# Patient Record
Sex: FEMALE | Race: NATIVE HAWAIIAN OR OTHER PACIFIC ISLANDER | NOT HISPANIC OR LATINO | Employment: FULL TIME | URBAN - METROPOLITAN AREA
[De-identification: names, ages, dates, MRNs, and addresses within clinical notes are randomized per-mention and may not be internally consistent; named-entity substitution may affect disease eponyms.]

---

## 2022-07-27 ENCOUNTER — GYNECOLOGY VISIT (OUTPATIENT)
Dept: OBGYN | Facility: CLINIC | Age: 22
End: 2022-07-27

## 2022-07-27 VITALS — SYSTOLIC BLOOD PRESSURE: 98 MMHG | WEIGHT: 140 LBS | DIASTOLIC BLOOD PRESSURE: 64 MMHG

## 2022-07-27 DIAGNOSIS — Z34.82 ENCOUNTER FOR SUPERVISION OF OTHER NORMAL PREGNANCY IN SECOND TRIMESTER: ICD-10-CM

## 2022-07-27 PROBLEM — Z34.92 SUPERVISION OF NORMAL PREGNANCY IN SECOND TRIMESTER: Status: ACTIVE | Noted: 2022-07-27

## 2022-07-27 PROCEDURE — 76830 TRANSVAGINAL US NON-OB: CPT | Performed by: OBSTETRICS & GYNECOLOGY

## 2022-07-27 PROCEDURE — 99203 OFFICE O/P NEW LOW 30 MIN: CPT | Mod: 25 | Performed by: OBSTETRICS & GYNECOLOGY

## 2022-07-27 NOTE — PROGRESS NOTES
21 y.o.  Unknown By LMP Patient's last menstrual period was 2022 (approximate).  here for confirmation of pregnancy. Pt has not gotten any PNC to date. She denies any Vb/cramping, reports minimal N/V. This is a planned pregnancy. Pt is happy to continue this pregnancy. Pt is not taking a PNV +DHA. She is overall well, without complaints. Last pap smear was never due to age.    COVID vaccine: Yes + booster  Flu vaccine: No    I have reviewed the patients'  medical, obstetrical,social, and family histories, medications and available lab results.    No pain, bleeding, unusual discharge or leeking              Allergies: Patient has no known allergies.    History reviewed. No pertinent past medical history.  Past Surgical History:   Procedure Laterality Date   • ABDOMINAL EXPLORATION         Objective:BP (!) 98/64   Wt 63.5 kg (140 lb)      HEENT:atraumatic  normocephalic  Abdomen:Soft, nontender, no hernias, masses, or organomegaly  Extremities:Normal    Abdominal Ultrasound performed and read by myself:  Nml appearing uterus and unable to visualize ovaries    Second/third trimester findings: BPD: 3.50 cm HC: 13.37 cm AC: 11.10 cm, FL 1.96 cm, Grossly nml AFT. Placenta appears posterior/fundal. AUA 16w4d    Assessment: 20 yo  @ 17w0d EDC 2023 doing well       Plan:  -Pt encouraged to start PNV+DHA  -SAB precautions reviewed  -1st tri prenatal labs to be ordered at initial OB labs  -Discussed dos and don'ts of pregnancy  -S/p COVID vaccine  -RTC in 1-2 weeks for initial OB appt.

## 2022-07-27 NOTE — PROGRESS NOTES
Patient here for GYN/DUB.  LMP= 3/30/22  ALEXANDRE= 1/4/23  GA= 17w0d  Last pap = pt states she has never had one   Phone number: [phone]   Pharmacy verified  c/o

## 2023-01-06 ENCOUNTER — HOSPITAL ENCOUNTER (INPATIENT)
Facility: MEDICAL CENTER | Age: 23
LOS: 2 days | End: 2023-01-08
Attending: OBSTETRICS & GYNECOLOGY | Admitting: OBSTETRICS & GYNECOLOGY
Payer: COMMERCIAL

## 2023-01-06 ENCOUNTER — ANESTHESIA (OUTPATIENT)
Dept: ANESTHESIOLOGY | Facility: MEDICAL CENTER | Age: 23
End: 2023-01-06
Payer: COMMERCIAL

## 2023-01-06 ENCOUNTER — ANESTHESIA EVENT (OUTPATIENT)
Dept: ANESTHESIOLOGY | Facility: MEDICAL CENTER | Age: 23
End: 2023-01-06
Payer: COMMERCIAL

## 2023-01-06 PROBLEM — O47.9 UTERINE CONTRACTIONS: Status: ACTIVE | Noted: 2023-01-06

## 2023-01-06 LAB
ABO GROUP BLD: NORMAL
BASOPHILS # BLD AUTO: 0.4 % (ref 0–1.8)
BASOPHILS # BLD: 0.04 K/UL (ref 0–0.12)
BLD GP AB SCN SERPL QL: NORMAL
EOSINOPHIL # BLD AUTO: 0.17 K/UL (ref 0–0.51)
EOSINOPHIL NFR BLD: 1.6 % (ref 0–6.9)
ERYTHROCYTE [DISTWIDTH] IN BLOOD BY AUTOMATED COUNT: 42.5 FL (ref 35.9–50)
GP B STREP DNA SPEC QL NAA+PROBE: NEGATIVE
HBV SURFACE AG SER QL: ABNORMAL
HCT VFR BLD AUTO: 34.7 % (ref 37–47)
HCV AB SER QL: ABNORMAL
HGB BLD-MCNC: 11.1 G/DL (ref 12–16)
HIV 1+2 AB+HIV1 P24 AG SERPL QL IA: NORMAL
IMM GRANULOCYTES # BLD AUTO: 0.08 K/UL (ref 0–0.11)
IMM GRANULOCYTES NFR BLD AUTO: 0.8 % (ref 0–0.9)
LYMPHOCYTES # BLD AUTO: 1.56 K/UL (ref 1–4.8)
LYMPHOCYTES NFR BLD: 14.7 % (ref 22–41)
MCH RBC QN AUTO: 27.4 PG (ref 27–33)
MCHC RBC AUTO-ENTMCNC: 32 G/DL (ref 33.6–35)
MCV RBC AUTO: 85.7 FL (ref 81.4–97.8)
MONOCYTES # BLD AUTO: 0.66 K/UL (ref 0–0.85)
MONOCYTES NFR BLD AUTO: 6.2 % (ref 0–13.4)
NEUTROPHILS # BLD AUTO: 8.13 K/UL (ref 2–7.15)
NEUTROPHILS NFR BLD: 76.3 % (ref 44–72)
NRBC # BLD AUTO: 0 K/UL
NRBC BLD-RTO: 0 /100 WBC
PLATELET # BLD AUTO: 258 K/UL (ref 164–446)
PMV BLD AUTO: 9.8 FL (ref 9–12.9)
RBC # BLD AUTO: 4.05 M/UL (ref 4.2–5.4)
RH BLD: NORMAL
RUBV AB SER QL: 68.4 IU/ML
T PALLIDUM AB SER QL IA: ABNORMAL
WBC # BLD AUTO: 10.6 K/UL (ref 4.8–10.8)

## 2023-01-06 PROCEDURE — 700111 HCHG RX REV CODE 636 W/ 250 OVERRIDE (IP): Performed by: OBSTETRICS & GYNECOLOGY

## 2023-01-06 PROCEDURE — 99285 EMERGENCY DEPT VISIT HI MDM: CPT

## 2023-01-06 PROCEDURE — 99999 PR NO CHARGE: CPT | Performed by: OBSTETRICS & GYNECOLOGY

## 2023-01-06 PROCEDURE — 87086 URINE CULTURE/COLONY COUNT: CPT

## 2023-01-06 PROCEDURE — 85025 COMPLETE CBC W/AUTO DIFF WBC: CPT

## 2023-01-06 PROCEDURE — 700105 HCHG RX REV CODE 258: Performed by: OBSTETRICS & GYNECOLOGY

## 2023-01-06 PROCEDURE — 86780 TREPONEMA PALLIDUM: CPT

## 2023-01-06 PROCEDURE — 87653 STREP B DNA AMP PROBE: CPT

## 2023-01-06 PROCEDURE — 87389 HIV-1 AG W/HIV-1&-2 AB AG IA: CPT

## 2023-01-06 PROCEDURE — 87340 HEPATITIS B SURFACE AG IA: CPT

## 2023-01-06 PROCEDURE — 700111 HCHG RX REV CODE 636 W/ 250 OVERRIDE (IP)

## 2023-01-06 PROCEDURE — 86900 BLOOD TYPING SEROLOGIC ABO: CPT

## 2023-01-06 PROCEDURE — 86850 RBC ANTIBODY SCREEN: CPT

## 2023-01-06 PROCEDURE — 86592 SYPHILIS TEST NON-TREP QUAL: CPT

## 2023-01-06 PROCEDURE — 86901 BLOOD TYPING SEROLOGIC RH(D): CPT

## 2023-01-06 PROCEDURE — 87081 CULTURE SCREEN ONLY: CPT

## 2023-01-06 PROCEDURE — 700102 HCHG RX REV CODE 250 W/ 637 OVERRIDE(OP): Performed by: OBSTETRICS & GYNECOLOGY

## 2023-01-06 PROCEDURE — 86762 RUBELLA ANTIBODY: CPT

## 2023-01-06 PROCEDURE — A9270 NON-COVERED ITEM OR SERVICE: HCPCS | Performed by: OBSTETRICS & GYNECOLOGY

## 2023-01-06 PROCEDURE — 86803 HEPATITIS C AB TEST: CPT

## 2023-01-06 PROCEDURE — 59409 OBSTETRICAL CARE: CPT

## 2023-01-06 PROCEDURE — 36415 COLL VENOUS BLD VENIPUNCTURE: CPT

## 2023-01-06 PROCEDURE — 304965 HCHG RECOVERY SERVICES

## 2023-01-06 PROCEDURE — 770002 HCHG ROOM/CARE - OB PRIVATE (112)

## 2023-01-06 PROCEDURE — 80307 DRUG TEST PRSMV CHEM ANLYZR: CPT

## 2023-01-06 PROCEDURE — 87150 DNA/RNA AMPLIFIED PROBE: CPT

## 2023-01-06 PROCEDURE — 0KQM0ZZ REPAIR PERINEUM MUSCLE, OPEN APPROACH: ICD-10-PCS | Performed by: OBSTETRICS & GYNECOLOGY

## 2023-01-06 RX ORDER — SODIUM CHLORIDE, SODIUM LACTATE, POTASSIUM CHLORIDE, CALCIUM CHLORIDE 600; 310; 30; 20 MG/100ML; MG/100ML; MG/100ML; MG/100ML
INJECTION, SOLUTION INTRAVENOUS CONTINUOUS
Status: DISCONTINUED | OUTPATIENT
Start: 2023-01-06 | End: 2023-01-06 | Stop reason: HOSPADM

## 2023-01-06 RX ORDER — MISOPROSTOL 200 UG/1
800 TABLET ORAL
Status: DISCONTINUED | OUTPATIENT
Start: 2023-01-06 | End: 2023-01-06 | Stop reason: HOSPADM

## 2023-01-06 RX ORDER — SODIUM CHLORIDE, SODIUM LACTATE, POTASSIUM CHLORIDE, AND CALCIUM CHLORIDE .6; .31; .03; .02 G/100ML; G/100ML; G/100ML; G/100ML
250 INJECTION, SOLUTION INTRAVENOUS PRN
Status: DISCONTINUED | OUTPATIENT
Start: 2023-01-06 | End: 2023-01-06 | Stop reason: HOSPADM

## 2023-01-06 RX ORDER — LIDOCAINE HYDROCHLORIDE 10 MG/ML
20 INJECTION, SOLUTION INFILTRATION; PERINEURAL
Status: DISCONTINUED | OUTPATIENT
Start: 2023-01-06 | End: 2023-01-06 | Stop reason: HOSPADM

## 2023-01-06 RX ORDER — OXYTOCIN 10 [USP'U]/ML
10 INJECTION, SOLUTION INTRAMUSCULAR; INTRAVENOUS
Status: DISCONTINUED | OUTPATIENT
Start: 2023-01-06 | End: 2023-01-06 | Stop reason: HOSPADM

## 2023-01-06 RX ORDER — ACETAMINOPHEN 500 MG
1000 TABLET ORAL EVERY 6 HOURS PRN
Status: DISCONTINUED | OUTPATIENT
Start: 2023-01-06 | End: 2023-01-08 | Stop reason: HOSPADM

## 2023-01-06 RX ORDER — MISOPROSTOL 200 UG/1
600 TABLET ORAL
Status: DISCONTINUED | OUTPATIENT
Start: 2023-01-06 | End: 2023-01-08 | Stop reason: HOSPADM

## 2023-01-06 RX ORDER — BUPIVACAINE HYDROCHLORIDE 2.5 MG/ML
INJECTION, SOLUTION EPIDURAL; INFILTRATION; INTRACAUDAL
Status: ACTIVE
Start: 2023-01-06 | End: 2023-01-06

## 2023-01-06 RX ORDER — SODIUM CHLORIDE, SODIUM LACTATE, POTASSIUM CHLORIDE, AND CALCIUM CHLORIDE .6; .31; .03; .02 G/100ML; G/100ML; G/100ML; G/100ML
1000 INJECTION, SOLUTION INTRAVENOUS
Status: DISCONTINUED | OUTPATIENT
Start: 2023-01-06 | End: 2023-01-06 | Stop reason: HOSPADM

## 2023-01-06 RX ORDER — METHYLERGONOVINE MALEATE 0.2 MG/ML
INJECTION INTRAVENOUS
Status: ACTIVE
Start: 2023-01-06 | End: 2023-01-06

## 2023-01-06 RX ORDER — SODIUM CHLORIDE, SODIUM LACTATE, POTASSIUM CHLORIDE, CALCIUM CHLORIDE 600; 310; 30; 20 MG/100ML; MG/100ML; MG/100ML; MG/100ML
INJECTION, SOLUTION INTRAVENOUS PRN
Status: DISCONTINUED | OUTPATIENT
Start: 2023-01-06 | End: 2023-01-08 | Stop reason: HOSPADM

## 2023-01-06 RX ORDER — IBUPROFEN 800 MG/1
800 TABLET ORAL EVERY 8 HOURS PRN
Status: DISCONTINUED | OUTPATIENT
Start: 2023-01-06 | End: 2023-01-08 | Stop reason: HOSPADM

## 2023-01-06 RX ORDER — IBUPROFEN 800 MG/1
800 TABLET ORAL
Status: COMPLETED | OUTPATIENT
Start: 2023-01-06 | End: 2023-01-06

## 2023-01-06 RX ORDER — ROPIVACAINE HYDROCHLORIDE 2 MG/ML
INJECTION, SOLUTION EPIDURAL; INFILTRATION; PERINEURAL CONTINUOUS
Status: DISCONTINUED | OUTPATIENT
Start: 2023-01-06 | End: 2023-01-06 | Stop reason: HOSPADM

## 2023-01-06 RX ORDER — TERBUTALINE SULFATE 1 MG/ML
0.25 INJECTION, SOLUTION SUBCUTANEOUS
Status: DISCONTINUED | OUTPATIENT
Start: 2023-01-06 | End: 2023-01-06 | Stop reason: HOSPADM

## 2023-01-06 RX ORDER — DOCUSATE SODIUM 100 MG/1
100 CAPSULE, LIQUID FILLED ORAL 2 TIMES DAILY PRN
Status: DISCONTINUED | OUTPATIENT
Start: 2023-01-06 | End: 2023-01-08 | Stop reason: HOSPADM

## 2023-01-06 RX ORDER — ACETAMINOPHEN 500 MG
1000 TABLET ORAL
Status: COMPLETED | OUTPATIENT
Start: 2023-01-06 | End: 2023-01-06

## 2023-01-06 RX ADMIN — SODIUM CHLORIDE, POTASSIUM CHLORIDE, SODIUM LACTATE AND CALCIUM CHLORIDE: 600; 310; 30; 20 INJECTION, SOLUTION INTRAVENOUS at 10:02

## 2023-01-06 RX ADMIN — ACETAMINOPHEN 1000 MG: 500 TABLET ORAL at 14:47

## 2023-01-06 RX ADMIN — FENTANYL CITRATE 100 MCG: 50 INJECTION, SOLUTION INTRAMUSCULAR; INTRAVENOUS at 10:55

## 2023-01-06 RX ADMIN — IBUPROFEN 800 MG: 800 TABLET, FILM COATED ORAL at 11:54

## 2023-01-06 RX ADMIN — OXYTOCIN 20 UNITS: 10 INJECTION, SOLUTION INTRAMUSCULAR; INTRAVENOUS at 11:30

## 2023-01-06 RX ADMIN — OXYTOCIN 125 ML/HR: 10 INJECTION, SOLUTION INTRAMUSCULAR; INTRAVENOUS at 13:25

## 2023-01-06 RX ADMIN — SODIUM CHLORIDE 5 MILLION UNITS: 900 INJECTION INTRAVENOUS at 10:03

## 2023-01-06 ASSESSMENT — PAIN DESCRIPTION - PAIN TYPE: TYPE: ACUTE PAIN

## 2023-01-06 NOTE — H&P
"LABOR AND DELIVERY H&P    PATIENT ID:  NAME:  Leland Chamberlain  MRN:               [id number]  YOB: 2000     22 y.o. female  at 40w2d by 16w4d US. Report LMP of 3/30/22 approximately.     Subjective: Pt states she has been feeling contractions so she came to medical attention. States she has not had prenatal care. Was seen once at [facility] for confirmation of pregnancy. Patient was ordered for prenatal studies but did not complete.     positive for contractions  positive pain   negative for LOF  negative for vaginal bleeding  positive for fetal movement    PNL:  Blood Type unknown, Rubella unknown, HIV unknown, TrepAb unknown, HBsAg unknown, GC/CT unknown  1 HR GTT: Not completed  3 HR GTT: Not completed   GBS unknown     ROS: Patient denies any fever chills, nausea, vomiting, headache, chest pain, shortness of breath, or dysuria or unusual swelling of hands or feet.     PMHx:   No past medical history on file.     OB History    Para Term  AB Living   2 1 1     1   SAB IAB Ectopic Molar Multiple Live Births             1      # Outcome Date GA Lbr Jose J/2nd Weight Sex Delivery Anes PTL Lv   2 Current            1 Term 2020    M Vag-Spont   LORIE       GYN: denies STIs, no cervical procedures    PSHx:  Past Surgical History:   Procedure Laterality Date    ABDOMINAL EXPLORATION         Social Hx:  Social History     Tobacco Use    Smoking status: Never    Smokeless tobacco: Never   Vaping Use    Vaping Use: Never used   Substance Use Topics    Alcohol use: Not Currently    Drug use: Never       Medications:  No current facility-administered medications on file prior to encounter.     No current outpatient medications on file prior to encounter.       Allergies:  Denies     Objective:    Vitals:    23 0930 23 0935 23 0936 23 0944   BP:    109/64   Pulse: 81 90     SpO2: 99% 99%  99%   Weight:   71.4 kg (157 lb 6.5 oz)    Height:   1.6 m (5' 3\")      No data " recorded.    General: No acute distress, resting comfortably in bed.  HEENT: normocephalic, nontraumatic, PERRLA, EOMI  Cardiovascular: Heart RRR with no murmurs, rubs or gallops. Distal Pulses 2+  Respiratory: symmetric chest expansion, lungs CTAB, with no wheezes, rales, rhonci  Abdomen: gravid, nontender  Musculoskeletal: MURPHY spontaneously  Neuro: non focal with no numbness, tingling or changes in sensation    Cervix:  8cm/80%/0  [place]: Uterine Contractions Q4 minutes.   FHRM: Baseline 120, moderate variability, + accels, no decels    Labs:   Pending     Assessment:  22 y.o. female  at 40w2d by 16w4d US presents with active labor.     Plan:   - Obtain PNL, pending  - Penicillin G for GBS unknown   - Confirmed vertex on BSUS  - Admit to L&D  - Initiate routine intrapartum care  - Cat 1 tracing, continue to monitor EFM  - Epidural Anesthesia desired  - LR @ 125ml/h    Discussed case with Dr. Hernandez, [facility] Attending. Case was discussed and attending agreed with plan prior to admission of patient.      Dee Robin M.D.

## 2023-01-06 NOTE — L&D DELIVERY NOTE
Delivery Note    Obstetrician:   Jemima Hernandez MD; Baby delivered by Gorge MARTÍNEZ.    Assistant: None    Pre-Delivery Diagnosis: Active labor    Post-Delivery Diagnosis: Living  infant(s) or Male    Procedure: Spontaneous vaginal delivery    Episiotomy or Incision: none    Indications for instrumental delivery: none    Infant Wt: 3825 gm.     Apgars: 1' 8  /  5' 9     Placenta and Cord:          Mechanism: spontaneous        Description:  complete, 3 vessel cord    Estimated Blood Loss:  less than 100 ml                   Specimens: None           Complications:  none           Condition: stable    Blood Type and Rh: Pending      Rubella Immunity Status:  Pending      Procedure:    Rapid Progression of labor to complete dilation and SROM.    precipitous by RN. Nuchal cord x 1.    of intact placenta with 3VC. Delivery of placenta by Dr. Mckeon.   2nd deg MLL repaired by Dr. Mckeon.   Uterus firm, hemostatic.    cc.

## 2023-01-06 NOTE — PROGRESS NOTES
"22 y.o.  [facility] female presents to L&D  c/o UC's since 0600. Pt reports positive fetal movement, denies vaginal bleeding or LOF.   EDC based off LMP 23 per Pt. No has received no prenatal care, or US. NKDA. Pt had a  in 2020. Large vertical scar on abdomen. Pt states she had \"surgery when she was 1 year old related to an infection from eating raw noodles\"  Pt moaning with UC. SVE /0/vertex  Report to Dr. Robin, admit order received. Pt transferred to [facility] for Labor and delivery. Report to Allyssa Pennington and Dr. Hernandez.   "

## 2023-01-06 NOTE — PROGRESS NOTES
0935: Report received from Tanya Moore. Pt ambulated to labor room.  0940: Monitors on x 2; pt grimacing and moaning w/ contractions; pt denies epidural or other pharmacological pain management interventions at this time.  0955: Dr. Robin at bedside; head down confirmed via bedside US. POC discussed w/ MD; orders received.  1020: Dr. Hernandez at bedside to assess pt; POC discussed.  1033: This RN called Dr. Robin to discuss POC; orders received to call Dr. Robin for delivery.  1039:This RN updated Dr. Robin on pt status; POC discussed.  1049: This RN called MD Lobito to update on pt status; orders received to administer fentanyl for pain relief (see MAR).  1100: Dr. Robin at bedside; prolonged fetal deceleration (see MAR and flowsheets for interventions) POC discussed.  1105: Dr. Robin notified this RN that he and Dr. Hernandez would be going to the OR for a c/s;  this RN notified Dr. Robin of concerns that pt would deliver while the MD was unavailable; Dr. Robin to discuss with Dr. Hernandez and notify RN of layton ADAME.  1119: This RN called Dr. Robin in attempt to notify MD of imminent delivery.  1121: Cord pulled. Delivery of viable male infant by this RN.; APGARS 8/9; 2 additional RNs at bedside for pickup.  1128: Dr. Mckeon at bedside to assess pt.   1130: Delivery of placenta and laceration repaired by MD Linda; .  1204: Dr. Hernandez at bedside to assess pt.  1430: Pt up to bathroom w/ Rn assistance; voided adequately.  1605: Pt transported to PPU in stable condition via wheelchair.  1630: Report given to TANYA Cardenas.

## 2023-01-06 NOTE — ED PROVIDER NOTES
"LABOR AND DELIVERY TRIAGE NOTE    PATIENT ID:  NAME:  Leland Chamberlain  MRN:               [id number]  YOB: 2000     22 y.o. female  at 40w2d by 16w4d US. Report LMP of 3/30/22 approximately.     Subjective: Pt states she has been feeling contractions so she came to medical attention. States she has not had prenatal care. Was seen once at [facility] for confirmation of pregnancy. Patient was ordered for prenatal studies but did not complete.     positive for contractions  positive pain   negative for LOF  negative for vaginal bleeding  positive for fetal movement    PNL:  Blood Type unknown, Rubella unknown, HIV unknown, TrepAb unknown, HBsAg unknown, GC/CT unknown  1 HR GTT: Not completed  3 HR GTT: Not completed   GBS unknown       ROS: Patient denies any fever chills, nausea, vomiting, headache, chest pain, shortness of breath, or dysuria or unusual swelling of hands or feet.     PMHx:   No past medical history on file.     OB History    Para Term  AB Living   2 1 1     1   SAB IAB Ectopic Molar Multiple Live Births             1      # Outcome Date GA Lbr Jose J/2nd Weight Sex Delivery Anes PTL Lv   2 Current            1 Term 2020    M Vag-Spont   LORIE       GYN: denies STIs, no cervical procedures    PSHx:  Past Surgical History:   Procedure Laterality Date    ABDOMINAL EXPLORATION         Social Hx:  Social History     Tobacco Use    Smoking status: Never    Smokeless tobacco: Never   Vaping Use    Vaping Use: Never used   Substance Use Topics    Alcohol use: Not Currently    Drug use: Never       Medications:  No current facility-administered medications on file prior to encounter.     No current outpatient medications on file prior to encounter.       Allergies:  Denies     Objective:    Vitals:    23 0925 23 0930 23 0935 23 0936   BP: 109/64      Pulse: 89 81 90    SpO2:  99% 99%    Weight:    71.4 kg (157 lb 6.5 oz)   Height:    1.6 m (5' 3\")     No " data recorded.    General: No acute distress, resting comfortably in bed.  HEENT: normocephalic, nontraumatic, PERRLA, EOMI  Cardiovascular: Heart RRR with no murmurs, rubs or gallops. Distal Pulses 2+  Respiratory: symmetric chest expansion, lungs CTAB, with no wheezes, rales, rhonci  Abdomen: gravid, nontender  Musculoskeletal: MURPHY spontaneously  Neuro: non focal with no numbness, tingling or changes in sensation    Cervix:  8cm/80%/0  [place]: Uterine Contractions Q4 minutes.   FHRM: Baseline 120, moderate variability, + accels, no decels    Labs:   Pending     Assessment:  22 y.o. female  at 40w2d by 16w4d US presents with active labor.     Plan:   - Obtain PNL, pending  - Penicillin G for GBS unknown   - Confirmed vertex on BSUS  - Admit to L&D  - Initiate routine intrapartum care  - Cat 1 tracing, continue to monitor EFM  - Epidural Anesthesia desired  - LR @ 125ml/h    Discussed case with Dr. Hernandez, [facility] Attending. Case was discussed and attending agreed with plan prior to admission of patient.      Dee Robin M.D.

## 2023-01-06 NOTE — ANESTHESIA PREPROCEDURE EVALUATION
Date: 01/06/23  Procedure: Labor Epidural         Relevant Problems   No relevant active problems       Physical Exam    Airway   Mallampati: II  TM distance: >3 FB  Neck ROM: full       Cardiovascular - normal exam  Rhythm: regular  Rate: normal  (-) murmur     Dental - normal exam           Pulmonary - normal exam  Breath sounds clear to auscultation     Abdominal    Neurological - normal exam                 Anesthesia Plan    ASA 2       Plan - epidural   Neuraxial block will be labor analgesia                  Pertinent diagnostic labs and testing reviewed    Informed Consent:    Anesthetic plan and risks discussed with patient.

## 2023-01-07 ENCOUNTER — PHARMACY VISIT (OUTPATIENT)
Dept: PHARMACY | Facility: MEDICAL CENTER | Age: 23
End: 2023-01-07
Payer: COMMERCIAL

## 2023-01-07 LAB
AMPHET UR QL SCN: NEGATIVE
BARBITURATES UR QL SCN: NEGATIVE
BENZODIAZ UR QL SCN: NEGATIVE
BZE UR QL SCN: NEGATIVE
CANNABINOIDS UR QL SCN: NEGATIVE
ERYTHROCYTE [DISTWIDTH] IN BLOOD BY AUTOMATED COUNT: 42.5 FL (ref 35.9–50)
GP B STREP DNA SPEC QL NAA+PROBE: NEGATIVE
HCT VFR BLD AUTO: 26.3 % (ref 37–47)
HGB BLD-MCNC: 8.5 G/DL (ref 12–16)
MCH RBC QN AUTO: 27.8 PG (ref 27–33)
MCHC RBC AUTO-ENTMCNC: 32.3 G/DL (ref 33.6–35)
MCV RBC AUTO: 85.9 FL (ref 81.4–97.8)
METHADONE UR QL SCN: NEGATIVE
OPIATES UR QL SCN: NEGATIVE
OXYCODONE UR QL SCN: NEGATIVE
PCP UR QL SCN: NEGATIVE
PLATELET # BLD AUTO: 243 K/UL (ref 164–446)
PMV BLD AUTO: 9.6 FL (ref 9–12.9)
PROPOXYPH UR QL SCN: NEGATIVE
RBC # BLD AUTO: 3.06 M/UL (ref 4.2–5.4)
WBC # BLD AUTO: 12.3 K/UL (ref 4.8–10.8)

## 2023-01-07 PROCEDURE — A9270 NON-COVERED ITEM OR SERVICE: HCPCS

## 2023-01-07 PROCEDURE — RXMED WILLOW AMBULATORY MEDICATION CHARGE

## 2023-01-07 PROCEDURE — 770002 HCHG ROOM/CARE - OB PRIVATE (112)

## 2023-01-07 PROCEDURE — 85027 COMPLETE CBC AUTOMATED: CPT

## 2023-01-07 PROCEDURE — 700102 HCHG RX REV CODE 250 W/ 637 OVERRIDE(OP)

## 2023-01-07 PROCEDURE — 36415 COLL VENOUS BLD VENIPUNCTURE: CPT

## 2023-01-07 RX ORDER — SENNA AND DOCUSATE SODIUM 50; 8.6 MG/1; MG/1
1 TABLET, FILM COATED ORAL DAILY
Qty: 14 TABLET | Refills: 0 | COMMUNITY
Start: 2023-01-07

## 2023-01-07 RX ORDER — IBUPROFEN 800 MG/1
800 TABLET ORAL EVERY 8 HOURS PRN
Qty: 30 TABLET | Refills: 0 | Status: SHIPPED | OUTPATIENT
Start: 2023-01-07

## 2023-01-07 RX ORDER — FERROUS SULFATE 325(65) MG
325 TABLET ORAL
Status: DISCONTINUED | OUTPATIENT
Start: 2023-01-07 | End: 2023-01-08 | Stop reason: HOSPADM

## 2023-01-07 RX ORDER — FERROUS SULFATE 325(65) MG
325 TABLET ORAL
Qty: 30 TABLET | Refills: 0 | Status: SHIPPED | OUTPATIENT
Start: 2023-01-07

## 2023-01-07 RX ORDER — ACETAMINOPHEN 500 MG
1000 TABLET ORAL EVERY 6 HOURS PRN
Qty: 30 TABLET | Refills: 0 | COMMUNITY
Start: 2023-01-07

## 2023-01-07 RX ADMIN — IBUPROFEN 800 MG: 800 TABLET, FILM COATED ORAL at 01:28

## 2023-01-07 RX ADMIN — FERROUS SULFATE TAB 325 MG (65 MG ELEMENTAL FE) 325 MG: 325 (65 FE) TAB at 11:03

## 2023-01-07 ASSESSMENT — EDINBURGH POSTNATAL DEPRESSION SCALE (EPDS)
I HAVE LOOKED FORWARD WITH ENJOYMENT TO THINGS: RATHER LESS THAN I USED TO
I HAVE BEEN SO UNHAPPY THAT I HAVE BEEN CRYING: YES, MOST OF THE TIME
I HAVE FELT SCARED OR PANICKY FOR NO GOOD REASON: YES, SOMETIMES
I HAVE FELT SAD OR MISERABLE: YES, MOST OF THE TIME
THE THOUGHT OF HARMING MYSELF HAS OCCURRED TO ME: SOMETIMES
I HAVE BEEN ANXIOUS OR WORRIED FOR NO GOOD REASON: YES, SOMETIMES
I HAVE BEEN SO UNHAPPY THAT I HAVE HAD DIFFICULTY SLEEPING: YES, SOMETIMES
I HAVE BEEN ABLE TO LAUGH AND SEE THE FUNNY SIDE OF THINGS: AS MUCH AS I ALWAYS COULD
THINGS HAVE BEEN GETTING ON TOP OF ME: YES, SOMETIMES I HAVEN'T BEEN COPING AS WELL AS USUAL
I HAVE BLAMED MYSELF UNNECESSARILY WHEN THINGS WENT WRONG: YES, SOME OF THE TIME

## 2023-01-07 ASSESSMENT — PAIN DESCRIPTION - PAIN TYPE: TYPE: ACUTE PAIN

## 2023-01-07 NOTE — DISCHARGE PLANNING
Discharge Planning Assessment Post Partum    Reason for Referral: Limited prenatal care   Address: [address] [place] [id number]  Type of Living Situation:Stable   Mom Diagnosis: Postpartum  Baby Diagnosis: [place]   Primary Language: English     Name of Baby: Derrell Willoughby  Father of the Baby: Lorne Chamberlain  Involved in baby’s care? Yes  Contact Information: [phone]    Prenatal Care: Limited due to lack of insurance   Mom's PCP: None  PCP for new baby:List given     Support System: Yes  Coping/Bonding between mother & baby: MOB coping/bonding during assessment   Source of Feeding: Formula   Supplies for Infant: Yes. MOB stated sister will get car seat     Mom's Insurance: HTH  Baby Covered on Insurance:Yes  Mother Employed/School: [employer]   Other children in the home/names & ages: 3 yr old Perfecto     Financial Hardship/Income: None   Mom's Mental status: Stable and alert  Services used prior to admit: None    CPS History: None  Psychiatric History: Notified of depression screening. MOB denies this and resources given for postpartum depression.   Domestic Violence History: None  Drug/ETOH History: None    Resources Provided:  provided community resources and pediatrician list   Referrals Made: None     Clearance for Discharge: Baby is cleared to discharge with MOB and FOB when medically cleared

## 2023-01-07 NOTE — PROGRESS NOTES
Assumed care. Assessment complete. VSS. Fundus firm,lochia light. Pt ambulating and voiding without difficulty.

## 2023-01-07 NOTE — CARE PLAN
The patient is Stable - Low risk of patient condition declining or worsening    Shift Goals  Clinical Goals: Remain clinically stable    Progress made toward(s) clinical / shift goals:  Pt verbalizes when in pain and in need of pain medication to reach comfort goal. Pt fundus is firm and palpable, lochia light, and pt is urinating without difficulty. Pt actively involved in treatment plan of self and infant.     Patient is not progressing towards the following goals: N/A    Problem: Knowledge Deficit - Postpartum  Goal: Patient will verbalize and demonstrate understanding of self and infant care  Outcome: Progressing     Problem: Altered Physiologic Condition  Goal: Patient physiologically stable as evidenced by normal lochia, palpable uterine involution and vitals within normal limits  Outcome: Progressing

## 2023-01-07 NOTE — DISCHARGE SUMMARY
Discharge Summary:     Date of Admission: 2023  Date of Discharge: 23      Admitting diagnosis:    1. Pregnancy @ 40w2d  2. Scant prenatal care      Discharge Diagnosis:   1. Status post vaginal, spontaneous.  2. Scant prenatal care    History reviewed. No pertinent past medical history.  OB History    Para Term  AB Living   2 2 2     2   SAB IAB Ectopic Molar Multiple Live Births           0 2      # Outcome Date GA Lbr Jose J/2nd Weight Sex Delivery Anes PTL Lv   2 Term 23 40w2d  3.825 kg (8 lb 6.9 oz) M Vag-Spont None N LORIE   1 Term 2020    M Vag-Spont   LORIE     Past Surgical History:   Procedure Laterality Date    ABDOMINAL EXPLORATION       Patient has no known allergies.    Patient Active Problem List   Diagnosis    Supervision of normal pregnancy in second trimester    Uterine contractions       Hospital Course:   Pt is a 22 y.o. now  who presented on 2023 for active. Single male infant was delivered via  at 40w2d. Apgars 8, 9 at 1 and 5 minutes respectively.  ml.     Postpartum course notable for early ambulation, well managed pain, tolerance of diet, spontaneous voiding, and appropriate feeding of infant. She has remained afebrile and blood pressure has been well controlled. All maternal questions and concerns addressed    Physical Exam:  Temp:  [36.5 °C (97.7 °F)-36.7 °C (98.1 °F)] 36.6 °C (97.9 °F)  Pulse:  [61-99] 62  Resp:  [14-18] 17  BP: (101-124)/(62-78) 110/62  SpO2:  [94 %-100 %] 96 %  Physical Exam  General: well appearing, no apparent distress  Abdomen: soft, nontender, nondistended  Fundus: firm at level below umbilicus  Incision: not applicable, (vaginal delivery)  Perineum: Deferred  Extremities: symmetric, no peripheral edema, calves nontender    Current Facility-Administered Medications   Medication Dose    ferrous sulfate tablet 325 mg  325 mg    fentaNYL (SUBLIMAZE) injection 100 mcg  100 mcg    lactated ringers infusion      docusate  sodium (COLACE) capsule 100 mg  100 mg    ibuprofen (MOTRIN) tablet 800 mg  800 mg    acetaminophen (TYLENOL) tablet 1,000 mg  1,000 mg    tetanus-dipth-acell pertussis (Tdap) inj 0.5 mL  0.5 mL    PRN oxytocin (PITOCIN) (20 Units/1000 mL) PRN for excessive uterine bleeding - See Admin Instr  125-999 mL/hr    miSOPROStol (CYTOTEC) tablet 600 mcg  600 mcg       Recent Labs     23  0950 23  0258   WBC 10.6 12.3*   RBC 4.05* 3.06*   HEMOGLOBIN 11.1* 8.5*   HEMATOCRIT 34.7* 26.3*   MCV 85.7 85.9   MCH 27.4 27.8   MCHC 32.0* 32.3*   RDW 42.5 42.5   PLATELETCT 258 243   MPV 9.8 9.6       Activity/ Discharge Instructions::   Discharge to home  Pelvic Rest x 6 weeks  No heavy lifting x4 weeks  Call or come to ED for: heavy vaginal bleeding, fever >100.4, severe abdominal pain, severe headache, chest pain, shortness of breath,  N/V, incisional drainage, or other concerns.     Follow up:  [facility] in 5 weeks for vaginal delivery; 1 week for incision check for  delivery.     Discharge Meds:   Current Outpatient Medications   Medication Sig Dispense Refill    acetaminophen (TYLENOL) 500 MG Tab Take 2 Tablets by mouth every 6 hours as needed for Mild Pain. 30 Tablet 0    sennosides-docusate sodium (SENOKOT-S) 8.6-50 MG tablet Take 1 Tablet by mouth every day. 14 Tablet 0    ferrous sulfate 325 (65 Fe) MG tablet Take 1 Tablet by mouth every morning with breakfast. 30 Tablet 0    ibuprofen (MOTRIN) 800 MG Tab Take 1 Tablet by mouth every 8 hours as needed for Moderate Pain. 30 Tablet 0           Dee Robin M.D.

## 2023-01-07 NOTE — DISCHARGE INSTRUCTIONS

## 2023-01-07 NOTE — L&D DELIVERY NOTE
DATE OF SERVICE:  01/06/2023     DELIVERY NOTE:  The patient underwent a normal spontaneous vaginal delivery of   a viable male infant over intact perineum without epidural placement.    Spontaneous vaginal delivery of the head in controlled sterile fashion.  No   nuchal cord was noted.  The rest of infant delivered through uncomplicated.    Cord was clamped x2 and cut and infant was placed on mother's chest.    Spontaneous vaginal delivery of intact placenta with 3-vessel cord.  Fundus   was firm with Pitocin and massage.  A second-degree perineal laceration that   extended to the anal verge was repaired using 3-0 and 4-0 Vicryl.  Total   estimated blood loss was approximately 100 mL.  Apgars of the infant were 8   and 9.  Mother and infant were both stable at the end of delivery.        ______________________________  Corinne E. Capurro, MD CEC/ECHO/AMENA    DD:  01/06/2023 11:49  DT:  01/06/2023 18:07    Job#:  [id number]

## 2023-01-07 NOTE — LACTATION NOTE
This note was copied from a baby's chart.  Mother prefers to exclusively feed her baby formula at this time. I did provide her with contact information brochure for WIC services as she reports she will be on Medicaid soon and is not working.

## 2023-01-07 NOTE — CARE PLAN
The patient is Stable - Low risk of patient condition declining or worsening         Progress made toward(s) clinical / shift goals:  Patient oriented to unit and completed oxytocin.

## 2023-01-07 NOTE — CARE PLAN
The patient is Stable - Low risk of patient condition declining or worsening    Shift Goals  Clinical Goals:  (pain control, fundus firm, lochia light)    Progress made toward(s) clinical / shift goals:  VSS, fundus remains firm, lochia light. Pt has denied pain entire shift    Patient is not progressing towards the following goals:

## 2023-01-07 NOTE — PROGRESS NOTES
Late Entry    Received report from L&D RN, Yanira, and assumed care of patient. Patient is lying in bed, vital signs are stable, and patient reports 0/10 pain at this time. Fundus is firm, lochia light and patient has no concerns at this time.   Assessment completed. POC discussed, all questions answered, and all needs met.

## 2023-01-07 NOTE — PROGRESS NOTES
"Patient assessment completed. Patient chart and orders reviewed. Reviewed plan of care with patient and patient verbalizes understanding. Monitoring of patient condition will continue.     0145- Depression screen collected from pt. Depression screen scored 19 and question #10 answered as \"sometimes\". Reevaluated screening answers with pt and pt states that none of these feelings are \"current\" and that \"this process has been overwhelming\". Pt states that she feels safe at home. Educated pt on notifying this RN when feelings are overwhelming and provided a safe space to communicate feelings. Notified on-call Midwife, Bhavana Freed, and orders to order social consult.     "

## 2023-01-08 VITALS
OXYGEN SATURATION: 98 % | SYSTOLIC BLOOD PRESSURE: 107 MMHG | DIASTOLIC BLOOD PRESSURE: 72 MMHG | HEIGHT: 63 IN | BODY MASS INDEX: 27.89 KG/M2 | TEMPERATURE: 99.1 F | RESPIRATION RATE: 18 BRPM | WEIGHT: 157.41 LBS | HEART RATE: 61 BPM

## 2023-01-08 NOTE — CARE PLAN
Problem: Altered Physiologic Condition  Goal: Patient physiologically stable as evidenced by normal lochia, palpable uterine involution and vitals within normal limits  Outcome: Progressing     Problem: Pain - Standard  Goal: Alleviation of pain or a reduction in pain to the patient’s comfort goal  Outcome: Progressing     The patient is Stable - Low risk of patient condition declining or worsening    Shift Goals  Clinical Goals: pain control/ maintain firm fundus with scant to light bleeding    Progress made toward(s) clinical / shift goals:  Patient requests to call for pain medication when needed. Patient aware of available prn medication and available nonpharmacologic pain intervention. Patient able to care for self and infant at current pain level.  Fundus firm. Lochia scant. Patient educated to call if saturating a pad in more than hour or for large clots.      Patient is not progressing towards the following goals:

## 2023-01-08 NOTE — DISCHARGE SUMMARY
Discharge Summary:     Date of Admission: 2023  Date of Discharge: 23      Patient was to be discharged  however baby was kept for care. Will discharge today, patient continues to be clinically stable for discharge.    Admitting diagnosis:    1. Pregnancy @ 40w2d  2. Scant prenatal care      Discharge Diagnosis:   1. Status post vaginal, spontaneous.  2. Scant prenatal care    History reviewed. No pertinent past medical history.  OB History    Para Term  AB Living   2 2 2     2   SAB IAB Ectopic Molar Multiple Live Births           0 2      # Outcome Date GA Lbr Jose J/2nd Weight Sex Delivery Anes PTL Lv   2 Term 23 40w2d  3.825 kg (8 lb 6.9 oz) M Vag-Spont None N LORIE   1 Term 2020    M Vag-Spont   LORIE     Past Surgical History:   Procedure Laterality Date    ABDOMINAL EXPLORATION       Patient has no known allergies.    Patient Active Problem List   Diagnosis    Supervision of normal pregnancy in second trimester    Uterine contractions       Hospital Course:   Pt is a 22 y.o. now  who presented on 2023 for active. Single male infant was delivered via  at 40w2d. Apgars 8, 9 at 1 and 5 minutes respectively.  ml.     Postpartum course notable for early ambulation, well managed pain, tolerance of diet, spontaneous voiding, and appropriate feeding of infant. She has remained afebrile and blood pressure has been well controlled. All maternal questions and concerns addressed    Physical Exam:  Temp:  [36.2 °C (97.1 °F)-37.3 °C (99.1 °F)] 37.3 °C (99.1 °F)  Pulse:  [61-77] 61  Resp:  [18] 18  BP: (101-107)/(69-72) 107/72  SpO2:  [97 %-98 %] 98 %  Physical Exam  General: well appearing, no apparent distress  Abdomen: soft, nontender, nondistended  Fundus: firm at level below umbilicus  Incision: not applicable, (vaginal delivery)  Perineum: Deferred  Extremities: symmetric, no peripheral edema, calves nontender    Current Facility-Administered Medications   Medication  Dose    ferrous sulfate tablet 325 mg  325 mg    fentaNYL (SUBLIMAZE) injection 100 mcg  100 mcg    lactated ringers infusion      docusate sodium (COLACE) capsule 100 mg  100 mg    ibuprofen (MOTRIN) tablet 800 mg  800 mg    acetaminophen (TYLENOL) tablet 1,000 mg  1,000 mg    tetanus-dipth-acell pertussis (Tdap) inj 0.5 mL  0.5 mL    PRN oxytocin (PITOCIN) (20 Units/1000 mL) PRN for excessive uterine bleeding - See Admin Instr  125-999 mL/hr    miSOPROStol (CYTOTEC) tablet 600 mcg  600 mcg       Recent Labs     23  0950 23  0258   WBC 10.6 12.3*   RBC 4.05* 3.06*   HEMOGLOBIN 11.1* 8.5*   HEMATOCRIT 34.7* 26.3*   MCV 85.7 85.9   MCH 27.4 27.8   MCHC 32.0* 32.3*   RDW 42.5 42.5   PLATELETCT 258 243   MPV 9.8 9.6         Activity/ Discharge Instructions::   Discharge to home  Pelvic Rest x 6 weeks  No heavy lifting x4 weeks  Call or come to ED for: heavy vaginal bleeding, fever >100.4, severe abdominal pain, severe headache, chest pain, shortness of breath,  N/V, incisional drainage, or other concerns.     Follow up:  [facility] in 5 weeks for vaginal delivery; 1 week for incision check for  delivery.     Discharge Meds:   Current Outpatient Medications   Medication Sig Dispense Refill    acetaminophen (TYLENOL) 500 MG Tab Take 2 Tablets by mouth every 6 hours as needed for Mild Pain. 30 Tablet 0    sennosides-docusate sodium (SENOKOT-S) 8.6-50 MG tablet Take 1 Tablet by mouth every day. 14 Tablet 0    ferrous sulfate 325 (65 Fe) MG tablet Take 1 Tablet by mouth every morning with breakfast. 30 Tablet 0    ibuprofen (MOTRIN) 800 MG Tab Take 1 Tablet by mouth every 8 hours as needed for Moderate Pain. 30 Tablet 0           Dee Robin M.D.

## 2023-01-09 LAB
BACTERIA UR CULT: NORMAL
SIGNIFICANT IND 70042: NORMAL
SITE SITE: NORMAL
SOURCE SOURCE: NORMAL